# Patient Record
Sex: FEMALE | Race: WHITE | Employment: OTHER | URBAN - METROPOLITAN AREA
[De-identification: names, ages, dates, MRNs, and addresses within clinical notes are randomized per-mention and may not be internally consistent; named-entity substitution may affect disease eponyms.]

---

## 2019-01-18 NOTE — PATIENT DISCUSSION
DIABETES- W/ PDR, OU. CONSULT  RETINAL SPECIALIST FOR EVALUATION AND POSS. TREATMENT. THE PATIENT UNDERSTANDS THAT HIS VISION WILL ALWAYS BE LIMITED BY HIS DIABETIC RETINOPATHY. REFER TO DR. Debbi Montilla FOR RETINA CLEARANCE.

## 2019-01-18 NOTE — PATIENT DISCUSSION
Surgery  Counseling: I have discussed the option of glasses versus cataract surgery versus following. It was explained that when vision no longer meets the patient's visual needs and a new prescription for glasses is not likely to improve the patient's visual symptoms, the option of cataract surgery is a reasonable next step. It was explained that there is no guarantee that removing the cataract will improve their visual symptoms, however; it is believed that the cataract is contributing to the patient's visual impairment and surgery may significantly improve both the visual and functional status of the patient. The risks, benefits and alternatives of surgery were discussed with the patient. After this discussion, the patient desires to proceed with cataract surgery with implantation of an intraocular lens to improve vision for normal daily activities.

## 2019-01-18 NOTE — PATIENT DISCUSSION
REFRACTIVE ERROR, OU - DISCUSSED OPTION OF CORRECTING AT THE TIME OF CATARACT SURGERY. PT UNDERSTANDS AND ELECTS TO CONSIDER THEIR OPTIONS. ADV THE PATIENT THAT HIS VISUAL POTENTIAL IS UNKNOWN.

## 2019-01-18 NOTE — PATIENT DISCUSSION
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 ADV PT THAT THIS MAY SLOW DOWN THE HEALING PROCESS AFTER CATARACT SURGERY.

## 2019-01-18 NOTE — PATIENT DISCUSSION
"Surgery Drops: Recommend patient start drops one week prior to surgery due to patient's retinal history. Patient to use brand name drops only including: Vigamox, Ilevro, and Durezol. Patient to administer ""as directed"". "

## 2019-01-28 NOTE — PATIENT DISCUSSION
RETINA IS ATTACHED OU: S/P PRP LASER OU; NO HOLES OR TEARS SEEN ON DILATED EXAM TODAY.  RETINAL DETACHMENT SIGNS AND SYMPTOMS REVIEWED

## 2019-01-28 NOTE — PATIENT DISCUSSION
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  RETURN FOR FOLLOW-UP AS SCHEDULED FOR DILATED EYE EXAM.

## 2019-01-31 NOTE — PATIENT DISCUSSION
Post-Op Instructions: The patient was instructed in the proper use of post-operative eye drops: Vigamox in the surgical eye 4 times per day for 2 weeks, then discontinue; Ilevro once per day for 4 weeks, then discontinue; Durezol 4 times per day for 2 weeks, then reduce to once per day for 2 weeks, then discontinue. Call back instructions, retinal detachment and endophthalmitis precautions given.

## 2019-01-31 NOTE — PATIENT DISCUSSION
Surgery  Counseling: I have discussed the option of glasses versus cataract surgery versus following . It was explained that when vision no longer meets the patient's visual needs and a new prescription for glasses is not likely to improve all of the patient's visual symptoms, the option of cataract surgery is a reasonable next step. It was explained that there is no guarantee that removing the cataract will improve their visual symptoms, however; it is believed that the cataract is contributing to the patient's visual impairment and surgery may significantly improve both the visual and functional status of the patient. The risks, benefits and alternatives of surgery were discussed with the patient. After this discussion, the patient desires to proceed with cataract surgery with implantation of an intraocular lens to improve vision and reduce glare.

## 2019-02-07 NOTE — PATIENT DISCUSSION
New Prescription: ofloxacin (ofloxacin): drops: 0.3% 1 drop four times a day as directed into left eye 02-

## 2019-02-07 NOTE — PATIENT DISCUSSION
***This patient had traditicataract surgery performed. A monofocal IOL was placed to achieve a target refraction of plano (which should provide them with satisfactory distance vision). ***

## 2019-02-07 NOTE — PATIENT DISCUSSION
CYST, OD RLL LONG STANDING- OFFERED REFERRAL TO DR Grabiel Crane FOR EVAL AND POSS REMOVAL. PT TO CONSIDER.

## 2019-02-07 NOTE — PATIENT DISCUSSION
OK PER KDS TO SWITCH FROM VIGAMOX TO OFLOXACIN DUE TO COST.  SENT IN TO PHARMACY AT APPOINTMENT TODAY

## 2019-06-12 ENCOUNTER — NEW REFERRAL (OUTPATIENT)
Dept: URBAN - METROPOLITAN AREA CLINIC 33 | Facility: CLINIC | Age: 84
End: 2019-06-12

## 2019-06-12 VITALS
HEIGHT: 66 IN | SYSTOLIC BLOOD PRESSURE: 126 MMHG | BODY MASS INDEX: 24.59 KG/M2 | DIASTOLIC BLOOD PRESSURE: 68 MMHG | HEART RATE: 72 BPM | WEIGHT: 153 LBS

## 2019-06-12 DIAGNOSIS — H35.3231: ICD-10-CM

## 2019-06-12 DIAGNOSIS — H43.813: ICD-10-CM

## 2019-06-12 DIAGNOSIS — H40.1130: ICD-10-CM

## 2019-06-12 PROCEDURE — 92250 FUNDUS PHOTOGRAPHY W/I&R: CPT

## 2019-06-12 PROCEDURE — 99204 OFFICE O/P NEW MOD 45 MIN: CPT

## 2019-06-12 ASSESSMENT — TONOMETRY
OS_IOP_MMHG: 13
OD_IOP_MMHG: 11

## 2019-06-12 ASSESSMENT — VISUAL ACUITY
OS_SC: 20/50+1
OD_SC: CF 4FT
OD_CC: CF 1FT
OS_CC: 20/60+2

## 2019-07-17 ENCOUNTER — CLINICAL PROCEDURE AND DIAGNOSTIC TESTING ONLY (OUTPATIENT)
Dept: URBAN - METROPOLITAN AREA CLINIC 33 | Facility: CLINIC | Age: 84
End: 2019-07-17

## 2019-07-17 DIAGNOSIS — H35.3231: ICD-10-CM

## 2019-07-17 PROCEDURE — 67028 INJECTION EYE DRUG: CPT

## 2019-07-17 PROCEDURE — 92134 CPTRZ OPH DX IMG PST SGM RTA: CPT

## 2019-07-17 ASSESSMENT — TONOMETRY
OS_IOP_MMHG: 14
OD_IOP_MMHG: 13

## 2019-07-17 ASSESSMENT — VISUAL ACUITY
OD_CC: 20/400-1
OS_CC: 20/50+2

## 2022-12-07 NOTE — PATIENT DISCUSSION
EXTENSIVE PRP, BDR UNDER CONTROL.  PATIENT INSTRUCTED TO RETURN TO PCP FOR  CONTINUED BLOOD SUGAR MONITORING AND RETURN FOR FOLLOW-UP AS SCHEDULED FOR DILATED  FUNDUS EXAM.

## 2022-12-07 NOTE — PATIENT DISCUSSION
PATIENT INSTRUCTED TO RETURN TO PCP FOR  CONTINUED BLOOD SUGAR MONITORING AND RETURN FOR FOLLOW-UP AS SCHEDULED FOR DILATED  FUNDUS EXAM.